# Patient Record
Sex: FEMALE | Race: OTHER | NOT HISPANIC OR LATINO
[De-identification: names, ages, dates, MRNs, and addresses within clinical notes are randomized per-mention and may not be internally consistent; named-entity substitution may affect disease eponyms.]

---

## 2018-08-31 ENCOUNTER — TRANSCRIPTION ENCOUNTER (OUTPATIENT)
Age: 23
End: 2018-08-31

## 2019-02-11 ENCOUNTER — TRANSCRIPTION ENCOUNTER (OUTPATIENT)
Age: 24
End: 2019-02-11

## 2020-02-25 ENCOUNTER — RESULT REVIEW (OUTPATIENT)
Age: 25
End: 2020-02-25

## 2021-02-03 PROBLEM — Z00.00 ENCOUNTER FOR PREVENTIVE HEALTH EXAMINATION: Status: ACTIVE | Noted: 2021-02-03

## 2021-02-04 ENCOUNTER — OUTPATIENT (OUTPATIENT)
Dept: OUTPATIENT SERVICES | Facility: HOSPITAL | Age: 26
LOS: 1 days | End: 2021-02-04
Payer: COMMERCIAL

## 2021-02-04 ENCOUNTER — RESULT REVIEW (OUTPATIENT)
Age: 26
End: 2021-02-04

## 2021-02-04 ENCOUNTER — APPOINTMENT (OUTPATIENT)
Dept: ORTHOPEDIC SURGERY | Facility: CLINIC | Age: 26
End: 2021-02-04
Payer: COMMERCIAL

## 2021-02-04 ENCOUNTER — TRANSCRIPTION ENCOUNTER (OUTPATIENT)
Age: 26
End: 2021-02-04

## 2021-02-04 VITALS — HEIGHT: 65 IN | WEIGHT: 153 LBS | BODY MASS INDEX: 25.49 KG/M2

## 2021-02-04 VITALS — DIASTOLIC BLOOD PRESSURE: 60 MMHG | HEART RATE: 63 BPM | OXYGEN SATURATION: 98 % | SYSTOLIC BLOOD PRESSURE: 110 MMHG

## 2021-02-04 DIAGNOSIS — S73.192A OTHER SPRAIN OF LEFT HIP, INITIAL ENCOUNTER: ICD-10-CM

## 2021-02-04 DIAGNOSIS — Z82.61 FAMILY HISTORY OF ARTHRITIS: ICD-10-CM

## 2021-02-04 DIAGNOSIS — Z78.9 OTHER SPECIFIED HEALTH STATUS: ICD-10-CM

## 2021-02-04 PROCEDURE — 72020 X-RAY EXAM OF SPINE 1 VIEW: CPT

## 2021-02-04 PROCEDURE — 72020 X-RAY EXAM OF SPINE 1 VIEW: CPT | Mod: 26

## 2021-02-04 PROCEDURE — 73502 X-RAY EXAM HIP UNI 2-3 VIEWS: CPT

## 2021-02-04 PROCEDURE — 99204 OFFICE O/P NEW MOD 45 MIN: CPT

## 2021-02-04 PROCEDURE — 73502 X-RAY EXAM HIP UNI 2-3 VIEWS: CPT | Mod: 26,LT

## 2021-02-04 PROCEDURE — 99072 ADDL SUPL MATRL&STAF TM PHE: CPT

## 2021-02-04 RX ORDER — LEVONORGESTREL AND ETHINYL ESTRADIOL 0.1-0.02MG
KIT ORAL
Refills: 0 | Status: ACTIVE | COMMUNITY

## 2021-02-05 NOTE — DISCUSSION/SUMMARY
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 referral for hip arthroscopy pending interval progress

## 2021-02-05 NOTE — PHYSICAL EXAM
[de-identified] : General appearance: well nourished and hydrated, pleasant, alert and oriented x 3, cooperative.  \par HEENT: normocephalic, EOM intact, wearing mask, external auditory canal clear.  \par Cardiovascular: no lower leg edema, no varicosities, dorsalis pedis pulses palpable and symmetric.  \par Lymphatics: no palpable lymphadenopathy, no lymphedema.  \par Neurologic: sensation is normal, no muscle weakness in upper or lower extremities, patella tendon reflexes present and symmetric.  \par Dermatologic: skin moist, warm, no rash.  \par Spine: cervical spine with normal lordosis and painless range of motion, thoracic spine with normal kyphosis and painless range of motion, lumbosacral spine with normal lordosis and painless range of motion.  No tenderness to palpation along midline spine and paraspinal musculature.  Sacroiliac joints nontender bilaterally. Negative SLR and crossed SLR tests bilaterally.\par Gait: normal.  \par \par Left hip:\par - Skin intact, no scars or other prior surgical incisions noted\par - No swelling/ecchymosis\par - Anterior tenderness on palpation\par - ROM: 120 flexion, 0 extension, 20 adduction, 70 abduction, 20 internal rotation, 90 external rotation\par - MARIBEL painful\par - FADIR painless\par - Yan negative\par - Stinchfield positive\par - Flexor power 5/5\par - Abductor power 5/5\par \par Right hip:\par - Skin intact, no scars or other prior surgical incisions noted\par - No swelling/ecchymosis\par - No specific tenderness on palpation\par - ROM: 140 flexion, 0 extension, 30 adduction, 70 abduction, 40 internal rotation, 90 external rotation\par - MARIBEL painless\par - FADIR painless\par - Yan negative\par - Stinchfield negative\par - Flexor power 5/5\par - Abductor power 5/5 [de-identified] : A lateral view of the lumbosacral spine, weightbearing AP pelvis, and 2 additional views (frog lateral and false profile) of the bilateral hips were obtained today, interpreted by me, and reviewed with the patient.\par \par The lumbar spine demonstrates lordosis within the normal range without evidence of listhesis or other instability. There is no spondylosis. Facet joints appear normal. \par \par The bilateral hips demonstrate normal alignment. There is no significant arthritis. Crossover sign is present bilaterally indicative of acetabular retroversion. Erosion noted at inferior aspect of right femoral head-neck junction, sharp osseous fragment noted inferior to left femoral head on the AP projection possibly inferior acetabular osteophyte vs. femoral head osteophyte. There is no radiographic osteonecrosis. Scattered pelvic phleboliths.\par \par The bilateral sacroiliac joints appear normal without arthrosis.\par

## 2021-02-05 NOTE — HISTORY OF PRESENT ILLNESS
[7] : a current pain level of 7/10 [Constant] : ~He/She~ states the symptoms seem to be constant [Hip Movement] : worsened by hip movement [Ice] : relieved by ice [Rest] : relieved by rest [de-identified] : 26y/o female presenting for evaluation of left hip pain. Symptoms progressive for about 2 months without history of injury or other specific inciting event. Pain is localized over the groin and exacerbated by activity. Was initially only an issue with certain extreme movements but is now painful all the time. Has a clicking/sticking sensation when attempting a straight leg raise with the leg externally rotated. 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 Has taken a couple of ibuprofen with mild relief, otherwise has not attempted any treatment aside from home exercise regimen. No prior episodes. Right hip is feeling fine. [de-identified] : describes pain as throbbing/achy sometimes sharp

## 2021-04-08 ENCOUNTER — APPOINTMENT (OUTPATIENT)
Dept: ORTHOPEDIC SURGERY | Facility: CLINIC | Age: 26
End: 2021-04-08
Payer: COMMERCIAL

## 2021-04-08 DIAGNOSIS — S73.192D OTHER SPRAIN OF LEFT HIP, SUBSEQUENT ENCOUNTER: ICD-10-CM

## 2021-04-08 PROCEDURE — 99213 OFFICE O/P EST LOW 20 MIN: CPT

## 2021-04-08 PROCEDURE — 99072 ADDL SUPL MATRL&STAF TM PHE: CPT

## 2021-04-08 NOTE — PHYSICAL EXAM
[de-identified] : General appearance: well nourished and hydrated, pleasant, alert and oriented x 3, cooperative.  \par HEENT: normocephalic, EOM intact, wearing mask, external auditory canal clear.  \par Cardiovascular: no lower leg edema, no varicosities, dorsalis pedis pulses palpable and symmetric.  \par Lymphatics: no palpable lymphadenopathy, no lymphedema.  \par Neurologic: sensation is normal, no muscle weakness in upper or lower extremities, patella tendon reflexes present and symmetric.  \par Dermatologic: skin moist, warm, no rash.  \par Spine: cervical spine with normal lordosis and painless range of motion, thoracic spine with normal kyphosis and painless range of motion, lumbosacral spine with normal lordosis and painless range of motion.\par Gait: normal.  \par \par Left hip:\par - Skin intact, no scars or other prior surgical incisions noted\par - No swelling/ecchymosis\par - No tenderness on palpation\par - ROM: 130 flexion, 0 extension, 20 adduction, 70 abduction, 30 internal rotation, 90 external rotation\par - MARIBEL painless\par - FADIR painless\par - Yan negative\par - Stinchfield negative\par - Flexor power 5/5\par - Abductor power 5/5\par \par Right hip:\par - Skin intact, no scars or other prior surgical incisions noted\par - No swelling/ecchymosis\par - No specific tenderness on palpation\par - ROM: 140 flexion, 0 extension, 30 adduction, 70 abduction, 40 internal rotation, 90 external rotation\par - MARIBEL painless\par - FADIR painless\par - Yan negative\par - Stinchfield negative\par - Flexor power 5/5\par - Abductor power 5/5

## 2021-04-08 NOTE — DISCUSSION/SUMMARY
[de-identified] : 26y/o female with presumed left acetabular labral tear, improving with conservative treatment\par - Cont PT\par - Cont Tylenol/ibuprofen as needed\par - F/U as needed. Reorder MRI if any progression of symptoms.

## 2021-04-08 NOTE — HISTORY OF PRESENT ILLNESS
[de-identified] : 4/8/21: Reports significant improvement with PT and occasional Tylenol/ibuprofen. Now the baseline pain is gone, the active pain has been reduced to intermittent "tightness" and mild discomfort. She is back to running and dancing a bit. She never went for the MRI. Would like to continue with PT.\par \par 2/4/21: 26y/o female presenting for evaluation of left hip pain. Symptoms progressive for about 2 months without history of injury or other specific inciting event. Pain is localized over the groin and exacerbated by activity. Was initially only an issue with certain extreme movements but is now painful all the time. Has a clicking/sticking sensation when attempting a straight leg raise with the leg externally rotated. 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 Has taken a couple of ibuprofen with mild relief, otherwise has not attempted any treatment aside from home exercise regimen. No prior episodes. Right hip is feeling fine.